# Patient Record
Sex: FEMALE | Race: OTHER | HISPANIC OR LATINO
[De-identification: names, ages, dates, MRNs, and addresses within clinical notes are randomized per-mention and may not be internally consistent; named-entity substitution may affect disease eponyms.]

---

## 2018-01-08 PROBLEM — Z00.00 ENCOUNTER FOR PREVENTIVE HEALTH EXAMINATION: Status: ACTIVE | Noted: 2018-01-08

## 2018-01-22 ENCOUNTER — APPOINTMENT (OUTPATIENT)
Dept: SURGERY | Facility: CLINIC | Age: 38
End: 2018-01-22
Payer: COMMERCIAL

## 2018-01-22 VITALS
WEIGHT: 146 LBS | HEART RATE: 92 BPM | SYSTOLIC BLOOD PRESSURE: 139 MMHG | BODY MASS INDEX: 26.87 KG/M2 | TEMPERATURE: 98.8 F | HEIGHT: 62 IN | DIASTOLIC BLOOD PRESSURE: 93 MMHG | OXYGEN SATURATION: 100 %

## 2018-01-22 PROCEDURE — 99203 OFFICE O/P NEW LOW 30 MIN: CPT

## 2018-03-12 ENCOUNTER — OUTPATIENT (OUTPATIENT)
Dept: OUTPATIENT SERVICES | Facility: HOSPITAL | Age: 38
LOS: 1 days | End: 2018-03-12
Payer: COMMERCIAL

## 2018-03-12 VITALS
OXYGEN SATURATION: 99 % | RESPIRATION RATE: 16 BRPM | HEIGHT: 62 IN | SYSTOLIC BLOOD PRESSURE: 122 MMHG | WEIGHT: 141.98 LBS | TEMPERATURE: 98 F | DIASTOLIC BLOOD PRESSURE: 88 MMHG | HEART RATE: 78 BPM

## 2018-03-12 DIAGNOSIS — Z98.890 OTHER SPECIFIED POSTPROCEDURAL STATES: Chronic | ICD-10-CM

## 2018-03-12 DIAGNOSIS — Z01.818 ENCOUNTER FOR OTHER PREPROCEDURAL EXAMINATION: ICD-10-CM

## 2018-03-12 DIAGNOSIS — N63.0 UNSPECIFIED LUMP IN UNSPECIFIED BREAST: ICD-10-CM

## 2018-03-12 PROCEDURE — G0463: CPT

## 2018-03-12 RX ORDER — SODIUM CHLORIDE 9 MG/ML
3 INJECTION INTRAMUSCULAR; INTRAVENOUS; SUBCUTANEOUS EVERY 8 HOURS
Qty: 0 | Refills: 0 | Status: DISCONTINUED | OUTPATIENT
Start: 2018-03-30 | End: 2018-03-30

## 2018-03-12 NOTE — H&P PST ADULT - NEGATIVE ALLERGY TYPES
no reactions to insect bites/no reactions to animals/no reactions to medicines/no outdoor environmental allergies/no indoor environmental allergies/no reactions to food

## 2018-03-12 NOTE — H&P PST ADULT - NEGATIVE OPHTHALMOLOGIC SYMPTOMS
no diplopia/no blurred vision L/no blurred vision R/no lacrimation L/no lacrimation R/no photophobia

## 2018-03-12 NOTE — H&P PST ADULT - REASON FOR ADMISSION
Felt a lump in right breast for 2-3 years, but because she has implants her doctor said what she is feeling in the right lateral breast is just the implant. But in 10/17, pt went back to her doctor and requested a sonogram

## 2018-03-12 NOTE — H&P PST ADULT - HISTORY OF PRESENT ILLNESS
37 yo female reports the above. Pt states the US of both breasts revealed a lump in the right breast, but at 4 o'clok in her right breast. Pt denies pain, discharge, redness in the right breast

## 2018-03-12 NOTE — H&P PST ADULT - FAMILY HISTORY
Mother  Still living? Yes, Estimated age: Age Unknown  Family history of hyperlipidemia, Age at diagnosis: Age Unknown     Father  Still living? Yes, Estimated age: Age Unknown  Family history of hypertension in father, Age at diagnosis: Age Unknown     Sibling  Still living? Yes, Estimated age: Age Unknown  Family history of high cholesterol, Age at diagnosis: Age Unknown

## 2018-03-12 NOTE — H&P PST ADULT - NSANTHOSAYNRD_GEN_A_CORE
No. MAURY screening performed.  STOP BANG Legend: 0-2 = LOW Risk; 3-4 = INTERMEDIATE Risk; 5-8 = HIGH Risk

## 2018-03-12 NOTE — H&P PST ADULT - NEGATIVE ENMT SYMPTOMS
no nasal congestion/no vertigo/no ear pain/no nasal discharge/no dry mouth/no tinnitus/no sinus symptoms/no hearing difficulty

## 2018-03-29 ENCOUNTER — TRANSCRIPTION ENCOUNTER (OUTPATIENT)
Age: 38
End: 2018-03-29

## 2018-03-30 ENCOUNTER — OUTPATIENT (OUTPATIENT)
Dept: OUTPATIENT SERVICES | Facility: HOSPITAL | Age: 38
LOS: 1 days | End: 2018-03-30
Payer: COMMERCIAL

## 2018-03-30 ENCOUNTER — RESULT REVIEW (OUTPATIENT)
Age: 38
End: 2018-03-30

## 2018-03-30 VITALS
TEMPERATURE: 99 F | SYSTOLIC BLOOD PRESSURE: 126 MMHG | OXYGEN SATURATION: 100 % | HEART RATE: 78 BPM | HEIGHT: 62 IN | DIASTOLIC BLOOD PRESSURE: 82 MMHG | RESPIRATION RATE: 16 BRPM | WEIGHT: 141.98 LBS

## 2018-03-30 VITALS
DIASTOLIC BLOOD PRESSURE: 66 MMHG | RESPIRATION RATE: 16 BRPM | TEMPERATURE: 98 F | SYSTOLIC BLOOD PRESSURE: 117 MMHG | HEART RATE: 76 BPM | OXYGEN SATURATION: 100 %

## 2018-03-30 DIAGNOSIS — N63.0 UNSPECIFIED LUMP IN UNSPECIFIED BREAST: ICD-10-CM

## 2018-03-30 DIAGNOSIS — Z98.890 OTHER SPECIFIED POSTPROCEDURAL STATES: Chronic | ICD-10-CM

## 2018-03-30 DIAGNOSIS — Z01.818 ENCOUNTER FOR OTHER PREPROCEDURAL EXAMINATION: ICD-10-CM

## 2018-03-30 LAB — HCG UR QL: NEGATIVE — SIGNIFICANT CHANGE UP

## 2018-03-30 PROCEDURE — 19125 EXCISION BREAST LESION: CPT | Mod: RT

## 2018-03-30 PROCEDURE — 19281 PERQ DEVICE BREAST 1ST IMAG: CPT | Mod: RT

## 2018-03-30 PROCEDURE — 19281 PERQ DEVICE BREAST 1ST IMAG: CPT

## 2018-03-30 PROCEDURE — 88305 TISSUE EXAM BY PATHOLOGIST: CPT

## 2018-03-30 PROCEDURE — 76098 X-RAY EXAM SURGICAL SPECIMEN: CPT

## 2018-03-30 PROCEDURE — 81025 URINE PREGNANCY TEST: CPT

## 2018-03-30 PROCEDURE — 88305 TISSUE EXAM BY PATHOLOGIST: CPT | Mod: 26

## 2018-03-30 PROCEDURE — 76098 X-RAY EXAM SURGICAL SPECIMEN: CPT | Mod: 26

## 2018-03-30 RX ORDER — OXYCODONE AND ACETAMINOPHEN 5; 325 MG/1; MG/1
1 TABLET ORAL EVERY 6 HOURS
Qty: 0 | Refills: 0 | Status: DISCONTINUED | OUTPATIENT
Start: 2018-03-30 | End: 2018-03-30

## 2018-03-30 RX ORDER — HYDROMORPHONE HYDROCHLORIDE 2 MG/ML
0.5 INJECTION INTRAMUSCULAR; INTRAVENOUS; SUBCUTANEOUS
Qty: 0 | Refills: 0 | Status: DISCONTINUED | OUTPATIENT
Start: 2018-03-30 | End: 2018-03-30

## 2018-03-30 RX ORDER — ONDANSETRON 8 MG/1
4 TABLET, FILM COATED ORAL ONCE
Qty: 0 | Refills: 0 | Status: DISCONTINUED | OUTPATIENT
Start: 2018-03-30 | End: 2018-03-30

## 2018-03-30 RX ORDER — SODIUM CHLORIDE 9 MG/ML
1000 INJECTION, SOLUTION INTRAVENOUS
Qty: 0 | Refills: 0 | Status: DISCONTINUED | OUTPATIENT
Start: 2018-03-30 | End: 2018-03-30

## 2018-03-30 RX ORDER — ONDANSETRON 8 MG/1
4 TABLET, FILM COATED ORAL EVERY 6 HOURS
Qty: 0 | Refills: 0 | Status: DISCONTINUED | OUTPATIENT
Start: 2018-03-30 | End: 2018-04-07

## 2018-03-30 NOTE — ASU DISCHARGE PLAN (ADULT/PEDIATRIC). - MEDICATION SUMMARY - MEDICATIONS TO TAKE
I will START or STAY ON the medications listed below when I get home from the hospital:    oxyCODONE-acetaminophen 5 mg-325 mg oral tablet  -- 1 tab(s) by mouth every 6 hours, As Needed -for moderate pain MDD:4 tablets   -- Caution federal law prohibits the transfer of this drug to any person other  than the person for whom it was prescribed.  May cause drowsiness.  Alcohol may intensify this effect.  Use care when operating dangerous machinery.  This prescription cannot be refilled.  This product contains acetaminophen.  Do not use  with any other product containing acetaminophen to prevent possible liver damage.  Using more of this medication than prescribed may cause serious breathing problems.    -- Indication: For Mass of breast

## 2018-03-30 NOTE — BRIEF OPERATIVE NOTE - PROCEDURE
<<-----Click on this checkbox to enter Procedure Mammogram breast right for localization, 3 views  03/30/2018    Active  [facility]  Breast mass excision  03/30/2018    Active  [facility]

## 2018-04-04 LAB — SURGICAL PATHOLOGY FINAL REPORT - CH: SIGNIFICANT CHANGE UP

## 2018-04-10 PROBLEM — Z80.6 FAMILY HISTORY OF LEUKEMIA: Status: ACTIVE | Noted: 2018-01-22

## 2018-04-10 PROBLEM — Z83.49 FAMILY HISTORY OF HYPERLIPIDEMIA: Status: ACTIVE | Noted: 2018-01-22

## 2018-04-10 PROBLEM — Z78.9 SOCIAL ALCOHOL USE: Status: ACTIVE | Noted: 2018-01-22

## 2018-04-10 PROBLEM — Z80.3 FAMILY HISTORY OF MALIGNANT NEOPLASM OF BREAST: Status: ACTIVE | Noted: 2018-01-22

## 2018-04-10 PROBLEM — Z82.49 FAMILY HISTORY OF HYPERTENSION: Status: ACTIVE | Noted: 2018-01-22

## 2018-04-10 PROBLEM — Z87.898 HISTORY OF LUMP OF RIGHT BREAST: Status: RESOLVED | Noted: 2018-04-10 | Resolved: 2018-04-10

## 2018-04-10 PROBLEM — Z87.898 HISTORY OF LUMP OF LEFT BREAST: Status: RESOLVED | Noted: 2018-04-10 | Resolved: 2018-04-10

## 2018-04-16 ENCOUNTER — APPOINTMENT (OUTPATIENT)
Dept: SURGERY | Facility: CLINIC | Age: 38
End: 2018-04-16
Payer: COMMERCIAL

## 2018-04-16 DIAGNOSIS — Z78.9 OTHER SPECIFIED HEALTH STATUS: ICD-10-CM

## 2018-04-16 DIAGNOSIS — Z80.3 FAMILY HISTORY OF MALIGNANT NEOPLASM OF BREAST: ICD-10-CM

## 2018-04-16 DIAGNOSIS — Z83.49 FAMILY HISTORY OF OTHER ENDOCRINE, NUTRITIONAL AND METABOLIC DISEASES: ICD-10-CM

## 2018-04-16 DIAGNOSIS — Z87.898 PERSONAL HISTORY OF OTHER SPECIFIED CONDITIONS: ICD-10-CM

## 2018-04-16 DIAGNOSIS — Z82.49 FAMILY HISTORY OF ISCHEMIC HEART DISEASE AND OTHER DISEASES OF THE CIRCULATORY SYSTEM: ICD-10-CM

## 2018-04-16 DIAGNOSIS — Z80.6 FAMILY HISTORY OF LEUKEMIA: ICD-10-CM

## 2018-04-16 PROCEDURE — 99213 OFFICE O/P EST LOW 20 MIN: CPT

## 2018-11-19 PROBLEM — Z86.018 HISTORY OF INTRADUCTAL PAPILLOMA: Status: RESOLVED | Noted: 2018-11-19 | Resolved: 2018-11-19

## 2018-11-26 ENCOUNTER — APPOINTMENT (OUTPATIENT)
Dept: SURGERY | Facility: CLINIC | Age: 38
End: 2018-11-26
Payer: COMMERCIAL

## 2018-11-26 DIAGNOSIS — Z86.018 PERSONAL HISTORY OF OTHER BENIGN NEOPLASM: ICD-10-CM

## 2018-12-10 ENCOUNTER — APPOINTMENT (OUTPATIENT)
Dept: SURGERY | Facility: CLINIC | Age: 38
End: 2018-12-10
Payer: COMMERCIAL

## 2018-12-10 VITALS
BODY MASS INDEX: 26.68 KG/M2 | SYSTOLIC BLOOD PRESSURE: 123 MMHG | HEIGHT: 62 IN | HEART RATE: 84 BPM | DIASTOLIC BLOOD PRESSURE: 84 MMHG | OXYGEN SATURATION: 97 % | TEMPERATURE: 99 F | WEIGHT: 145 LBS

## 2018-12-10 PROCEDURE — 99213 OFFICE O/P EST LOW 20 MIN: CPT

## 2019-03-14 PROBLEM — N63.0 UNSPECIFIED LUMP IN UNSPECIFIED BREAST: Chronic | Status: ACTIVE | Noted: 2018-03-12

## 2019-03-25 ENCOUNTER — APPOINTMENT (OUTPATIENT)
Dept: SURGERY | Facility: CLINIC | Age: 39
End: 2019-03-25
Payer: COMMERCIAL

## 2019-03-25 VITALS
HEART RATE: 82 BPM | WEIGHT: 150 LBS | HEIGHT: 62 IN | DIASTOLIC BLOOD PRESSURE: 104 MMHG | SYSTOLIC BLOOD PRESSURE: 145 MMHG | TEMPERATURE: 98.7 F | BODY MASS INDEX: 27.6 KG/M2

## 2019-03-25 PROCEDURE — 99213 OFFICE O/P EST LOW 20 MIN: CPT

## 2020-02-10 ENCOUNTER — APPOINTMENT (OUTPATIENT)
Dept: SURGERY | Facility: CLINIC | Age: 40
End: 2020-02-10
Payer: COMMERCIAL

## 2020-02-10 VITALS
DIASTOLIC BLOOD PRESSURE: 86 MMHG | HEIGHT: 62 IN | SYSTOLIC BLOOD PRESSURE: 140 MMHG | HEART RATE: 72 BPM | TEMPERATURE: 98.1 F | BODY MASS INDEX: 28.71 KG/M2 | OXYGEN SATURATION: 100 % | WEIGHT: 156 LBS

## 2020-02-10 PROCEDURE — 99213 OFFICE O/P EST LOW 20 MIN: CPT

## 2020-02-10 NOTE — DATA REVIEWED
[FreeTextEntry1] : \par Patient Name:  JENNY SOLER	Patient ID:  [id number]\par Patient :   1980	Gender:   Female\par Accession Number:   [id number]	Study Date:   17 Sep 2019 17:54\par Referring Phys.:  DOE FLORENTINO	Performing Location:   [facility]\par EXAM:  DIAGNOSTIC LEFT\par \par \par OVERALL IMPRESSION:\par \par Stable calcifications in the retroareolar region of the left breast are probably benign. They were initially seen in 2019. Short-term interval follow-up with mammography is recommended in 6 months (2020).\par \par A 6 month follow-up of both breast(s) is recommended. A bilateral full-field diagnostic mammogram with additional magnification views of the left breast is recommended in 6 months (2020). The patient will be due for bilateral annual surveillance at that time. Given breast density, adjunct screening with bilateral complete breast ultrasound may also be obtained at that time, at the discretion of the referring physician. A letter will be sent to the patient to return for follow up.\par \par The findings and recommendations were discussed with the patient.\par \par BI-RADS 3: PROBABLY BENIGN\par \par MAMMO TOMOSYNTHESIS DIAGNOSTIC LEFT, US BREAST LIMITED RIGHT\par \par Clinical Indication: Patient returns for a short term follow up of findings in bilateral breasts.\par \par Compared to: 2019, 10/25/2017, and 2017

## 2020-02-10 NOTE — PHYSICAL EXAM
[Alert] : alert [Oriented to Place] : oriented to place [Oriented to Time] : oriented to time [Oriented to Person] : oriented to person [de-identified] : The patient is alert, well-groomed, and cheerful. [Calm] : calm [de-identified] :  Neck supple. Trachea midline. Thyroid isthmus barely palpable, lobes not felt. [de-identified] : Pendulous ( BL implants) , symmetric. No masses; nipples without discharge. No palpable supraclavicular masses. Axillas are benign. [de-identified] : Thorax symmetric with good excursion. Lungs resonant. Breath sounds vesicular with no added sounds.

## 2020-02-10 NOTE — HISTORY OF PRESENT ILLNESS
[de-identified] : This is  a 40 year   old patient presenting today for a breast exam and to discuss the results of her recent  breast imaging.  Patient was seen and examined last  in March 2019 and was advised to repeat  Right breast Mammo and Left breast US in September.  Patient had a Right breast US and   Left  breast Mammogram on 09/17/2019. Deemed BIRADS category 3.   Ms. MARIN denies any trauma and she has no nipple discharge. Patient denies any fever, night sweats or loss of appetite.    There is  family history of breast carcinoma in Father's sister.    She has menstrual period  now.   Patient is on no hormonal replacement therapy.

## 2020-02-10 NOTE — PLAN
[FreeTextEntry1] : Ms. MARIN  is presenting  today for an evaluation .  she is doing well and offers no complaints.  Results of  her recent  imaging and physical examination findings were discussed in details.   She  was advised to have BL   breast US and Mammogram in  MArch 2020 and return after the tests. Importance of monthly self-breast examination was reinforced.  Patient's questions and concerns addressed to patient's satisfaction.\par

## 2021-01-18 NOTE — ASU DISCHARGE PLAN (ADULT/PEDIATRIC). - DRIVING
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 Dr. Delano Coley discussed pathology and recommended breast clinic. Encouraged Breast Clinic attendance and informed that [facility] will give apt information. All cards given for surgeons and oncologists. Wants to see      Patient Expresses Need(s) For: a surgeon, will consult with Daughter and let me know Monday    Requests Referral to Doctor(s) with appointment(s): n/a    Additional Referral(s): Brendan Rueda/Yudith Oswald: [facility],        Biopsy site status: sore, but okay    Teaching Sheets provided: Cancer Type: IDC, Tumor markers, radiation, Lump/Mast, Needle loc, [place] Node, Breast Cancer, Navigation Packet, Nurse Navigation contact information, Breast Clinic appt and map     Currently on HRT: no     If yes, was patient instructed to stop immediately: n/a    Notes: Explained terms doctor and navigators will discuss at next appointments. Questions addressed and encouraged patient to ask [facility]. Will receive call Monday. Referral faxed to Navigation.     Other :  Patient is in a W/C, she does not ambulate, she has MS      Results faxed to : Dr. Michelle Riley
No

## 2021-03-23 NOTE — H&P PST ADULT - VENOUS THROMBOEMBOLISM HISTORY
Called patient LVM for patient to call back to schedule f/u appointment with Dr Rice before her next botox injection  prior major surgery

## 2021-06-14 ENCOUNTER — APPOINTMENT (OUTPATIENT)
Dept: SURGERY | Facility: CLINIC | Age: 41
End: 2021-06-14
Payer: COMMERCIAL

## 2021-06-14 VITALS
WEIGHT: 150 LBS | SYSTOLIC BLOOD PRESSURE: 134 MMHG | BODY MASS INDEX: 27.6 KG/M2 | DIASTOLIC BLOOD PRESSURE: 91 MMHG | HEART RATE: 93 BPM | HEIGHT: 62 IN

## 2021-06-14 DIAGNOSIS — N63.0 UNSPECIFIED LUMP IN UNSPECIFIED BREAST: ICD-10-CM

## 2021-06-14 PROCEDURE — 99072 ADDL SUPL MATRL&STAF TM PHE: CPT

## 2021-06-14 PROCEDURE — 99213 OFFICE O/P EST LOW 20 MIN: CPT

## 2021-06-14 RX ORDER — AMLODIPINE BESYLATE 2.5 MG/1
2.5 TABLET ORAL
Refills: 0 | Status: ACTIVE | COMMUNITY

## 2021-06-14 NOTE — HISTORY OF PRESENT ILLNESS
[de-identified] : This is  a 40 year   old patient presenting today for a breast exam  .   she had her last breast imaging in 2019. Ms. MARIN denies any trauma and she has no nipple discharge. Patient denies any fever, night sweats or loss of appetite. There is family history of breast carcinoma in Father's sister. She has menstrual period in May 2020. Patient is on no hormonal replacement therapy. \par  [de-identified] : Patient reports no interval changes to her overall health status or medical history

## 2021-06-14 NOTE — PLAN
[FreeTextEntry1] : Ms. HERNANDEZ SOLER  is presenting  today for an evaluation .  she is doing well and offers no complaints.  Results of  her recent  imaging and physical examination findings were discussed in details.   She  was advised to have BL   breast US and Mammogram   and return after the tests in 6 months. Importance of monthly self-breast examination was reinforced.  Patient's questions and concerns addressed to patient's satisfaction.\par \par Vitamin E daily for breast pain \par Avoid caffein and Chocolates to prevent breast pain

## 2021-06-14 NOTE — PHYSICAL EXAM
[Alert] : alert [Oriented to Person] : oriented to person [Oriented to Place] : oriented to place [Oriented to Time] : oriented to time [Calm] : calm [de-identified] : She  is alert, well-groomed, and in NAD\par   [de-identified] : anicteric.  Nasal mucosa pink, septum midline. Oral mucosa pink.  Tongue midline, Pharynx without exudates.\par   [de-identified] : Neck supple. Trachea midline. Thyroid isthmus barely palpable, lobes not felt.\par   [de-identified] : Pendulous ( BL implants) , symmetric. No masses; nipples without discharge. No palpable supraclavicular masses. Axillas are benign.

## 2021-06-29 ENCOUNTER — NON-APPOINTMENT (OUTPATIENT)
Age: 41
End: 2021-06-29

## 2021-07-26 ENCOUNTER — APPOINTMENT (OUTPATIENT)
Dept: SURGERY | Facility: CLINIC | Age: 41
End: 2021-07-26
Payer: COMMERCIAL

## 2021-07-26 VITALS
SYSTOLIC BLOOD PRESSURE: 132 MMHG | WEIGHT: 150 LBS | HEIGHT: 62 IN | HEART RATE: 87 BPM | BODY MASS INDEX: 27.6 KG/M2 | DIASTOLIC BLOOD PRESSURE: 92 MMHG | TEMPERATURE: 98 F

## 2021-07-26 PROCEDURE — 99072 ADDL SUPL MATRL&STAF TM PHE: CPT

## 2021-07-26 PROCEDURE — 99213 OFFICE O/P EST LOW 20 MIN: CPT

## 2021-07-26 NOTE — PHYSICAL EXAM
[Alert] : alert [Oriented to Person] : oriented to person [Oriented to Place] : oriented to place [Oriented to Time] : oriented to time [Calm] : calm [de-identified] : She  is alert, well-groomed, and in NAD\par   [de-identified] : anicteric.  Nasal mucosa pink, septum midline. Oral mucosa pink.  Tongue midline, Pharynx without exudates.\par   [de-identified] : Neck supple. Trachea midline. Thyroid isthmus barely palpable, lobes not felt.\par   [de-identified] : Pendulous ( BL implants) , symmetric. No masses; nipples without discharge. No palpable supraclavicular masses. Axillas are benign.

## 2021-07-26 NOTE — DATA REVIEWED
[FreeTextEntry1] : Pt Name: Emily Wu\par  \par : 1980\par  \par MRN: [id number]\par  \par Referring: Ciaran Rondon\par  \par CC Recipient(s):  \par  \par Pt Phone: [phone]\par  \par  \par Procedure(s) \par Accession Number(s)\par Date of Service\par US BREAST CORE BIOPSY\par MAMMO DIGITAL POST PROCEDURE LEFT\par [id number]\par [id number]\par 21\par 21\par  \par Pathology Report Received From [facility]:\par  \par The pathology report of the left breast ultrasound-guided core biopsy dated 2021 indicated that the target at left 2:00, 3 cm from the nipple, is HIGH RISK \par  \par Breast, left, 2 o'clock, 3cm FN, core biopsy: \par - *Intraductal papilloma*.\par - Proliferative fibrocystic changes with usual ductal hyperplasia. \par  \par The pathology results are concordant with the imaging findings.\par  \par The patient has been notified of these results by telephone. She has been advised to contact your office and to arrange for SURGICAL CONSULTATION to discuss management options.\par  \par The 2 hypoechoic masses at left 4:00, 5 cm from the nipple, noted on prior ultrasound dated 2021, remain probably benign, favored to reflect a cyst cluster and cyst respectively. Recommend 6 month follow-up with targeted left ultrasound.\par  \par Surgical consultation of the left breast(s) is recommended. _\par  \par Electronic Signature: I personally reviewed the images and agree with this report. Final Report: Dictated by  and Signed by Attending Margie Ureña MD 2021 4:32 PM\par  \par Addended by: Margie Ureña DO on 2021  4:32 PM\par

## 2021-07-26 NOTE — PLAN
[FreeTextEntry1] : Ms. HERNANDEZ SOLER  was told significance of findings, options, risks and benefits were explained.  Informed consent for excision left breast mass with spot localization and potential risks, benefits and alternatives (surgical options were discussed including non-surgical options or the option of no surgery) to the planned surgery were discussed in depth.  All surgical options were discussed including non-surgical treatments.  She wishes to proceed with surgery.  We will plan for surgery on at the next available date, pending any required insurance pre-certification or pre-approval. She agrees to obtain any necessary pre-operative evaluations and testing prior to surgery.\par Patient advised to seek immediate medical attention with any acute change in symptoms or with the development of any new or worsening symptoms.  Patient's questions and concerns addressed to patient's satisfaction, and patient verbalized an understanding of the information discussed.\par \par

## 2021-07-26 NOTE — HISTORY OF PRESENT ILLNESS
[de-identified] : Ms. HERNANDEZ SOLER  is s/p BL breast US and Mammogram on 06/23/2021 that was deemed BIRADS4.  Ms. HERNANDEZ SOLER  is s/p US guided left breast biopsy on 07/13/2021. Patient's pathology results were  consistent with Intraductal papilloma,  Proliferative fibrocystic changes with usual ductal hyperplasia.  The pathology results are concordant with the imaging findings.  Ms. HERNANDEZ SOLER denies any trauma and she has no nipple discharge. Patient denies any fever, night sweats or loss of appetite. [de-identified] : Patient reports no interval changes to her overall health status or medical history

## 2021-08-27 ENCOUNTER — OUTPATIENT (OUTPATIENT)
Dept: OUTPATIENT SERVICES | Facility: HOSPITAL | Age: 41
LOS: 1 days | End: 2021-08-27
Payer: COMMERCIAL

## 2021-08-27 VITALS
WEIGHT: 149.91 LBS | DIASTOLIC BLOOD PRESSURE: 90 MMHG | OXYGEN SATURATION: 98 % | HEIGHT: 62 IN | TEMPERATURE: 98 F | RESPIRATION RATE: 18 BRPM | HEART RATE: 84 BPM | SYSTOLIC BLOOD PRESSURE: 144 MMHG

## 2021-08-27 DIAGNOSIS — N63.20 UNSPECIFIED LUMP IN THE LEFT BREAST, UNSPECIFIED QUADRANT: ICD-10-CM

## 2021-08-27 DIAGNOSIS — Z01.818 ENCOUNTER FOR OTHER PREPROCEDURAL EXAMINATION: ICD-10-CM

## 2021-08-27 DIAGNOSIS — Z98.890 OTHER SPECIFIED POSTPROCEDURAL STATES: Chronic | ICD-10-CM

## 2021-08-27 DIAGNOSIS — I10 ESSENTIAL (PRIMARY) HYPERTENSION: ICD-10-CM

## 2021-08-27 PROCEDURE — G0463: CPT

## 2021-08-27 RX ORDER — SODIUM CHLORIDE 9 MG/ML
3 INJECTION INTRAMUSCULAR; INTRAVENOUS; SUBCUTANEOUS EVERY 8 HOURS
Refills: 0 | Status: DISCONTINUED | OUTPATIENT
Start: 2021-09-03 | End: 2021-09-10

## 2021-08-27 NOTE — H&P PST ADULT - PROBLEM SELECTOR PLAN 1
Patient is scheduled for excision of left breast mass with pre-operative needle localization on 9/3/2021. Written and oral preoperative instructions given to patient with understanding verbalized. Instructions given to include using 4% chlorhexidine wash as directed day of surgery, maintaining NPO status post midnight day before surgery and expecting a phone call day before surgery to give arrival time for surgery.

## 2021-08-27 NOTE — H&P PST ADULT - NSICDXFAMILYHX_GEN_ALL_CORE_FT
FAMILY HISTORY:  Father  Still living? Yes, Estimated age: Age Unknown  Family history of hypertension in father, Age at diagnosis: Age Unknown    Mother  Still living? Yes, Estimated age: Age Unknown  Family history of hyperlipidemia, Age at diagnosis: Age Unknown    Sibling  Still living? Yes, Estimated age: Age Unknown  Family history of high cholesterol, Age at diagnosis: Age Unknown

## 2021-08-27 NOTE — H&P PST ADULT - FEMALE-SPECIFIC SYMPTOMS
Patient has Nexplanon birth control implant in upper left arm/irregular menses/abnormal vaginal bleeding

## 2021-08-27 NOTE — H&P PST ADULT - PROBLEM SELECTOR PLAN 2
Patient instructed with understanding verbalized to take amlodipine with a sip of water the morning of surgery.

## 2021-08-27 NOTE — H&P PST ADULT - NSICDXPASTSURGICALHX_GEN_ALL_CORE_FT
PAST SURGICAL HISTORY:   delivery delivered     H/O bilateral breast implants     H/O breast biopsy

## 2021-08-28 DIAGNOSIS — Z01.818 ENCOUNTER FOR OTHER PREPROCEDURAL EXAMINATION: ICD-10-CM

## 2021-08-31 ENCOUNTER — APPOINTMENT (OUTPATIENT)
Dept: DISASTER EMERGENCY | Facility: CLINIC | Age: 41
End: 2021-08-31

## 2021-09-01 LAB — SARS-COV-2 N GENE NPH QL NAA+PROBE: NOT DETECTED

## 2021-09-02 ENCOUNTER — TRANSCRIPTION ENCOUNTER (OUTPATIENT)
Age: 41
End: 2021-09-02

## 2021-09-03 ENCOUNTER — APPOINTMENT (OUTPATIENT)
Dept: SURGERY | Facility: HOSPITAL | Age: 41
End: 2021-09-03
Payer: COMMERCIAL

## 2021-09-03 ENCOUNTER — RESULT REVIEW (OUTPATIENT)
Age: 41
End: 2021-09-03

## 2021-09-03 ENCOUNTER — OUTPATIENT (OUTPATIENT)
Dept: OUTPATIENT SERVICES | Facility: HOSPITAL | Age: 41
LOS: 1 days | End: 2021-09-03
Payer: COMMERCIAL

## 2021-09-03 VITALS
OXYGEN SATURATION: 100 % | SYSTOLIC BLOOD PRESSURE: 116 MMHG | HEART RATE: 87 BPM | DIASTOLIC BLOOD PRESSURE: 86 MMHG | RESPIRATION RATE: 19 BRPM

## 2021-09-03 VITALS
HEART RATE: 73 BPM | TEMPERATURE: 98 F | DIASTOLIC BLOOD PRESSURE: 83 MMHG | WEIGHT: 149.91 LBS | SYSTOLIC BLOOD PRESSURE: 134 MMHG | HEIGHT: 62 IN | RESPIRATION RATE: 16 BRPM | OXYGEN SATURATION: 100 %

## 2021-09-03 DIAGNOSIS — Z98.890 OTHER SPECIFIED POSTPROCEDURAL STATES: Chronic | ICD-10-CM

## 2021-09-03 DIAGNOSIS — Z01.818 ENCOUNTER FOR OTHER PREPROCEDURAL EXAMINATION: ICD-10-CM

## 2021-09-03 DIAGNOSIS — N63.20 UNSPECIFIED LUMP IN THE LEFT BREAST, UNSPECIFIED QUADRANT: ICD-10-CM

## 2021-09-03 LAB — HCG UR QL: NEGATIVE — SIGNIFICANT CHANGE UP

## 2021-09-03 PROCEDURE — 88305 TISSUE EXAM BY PATHOLOGIST: CPT

## 2021-09-03 PROCEDURE — 19125 EXCISION BREAST LESION: CPT | Mod: LT

## 2021-09-03 PROCEDURE — 76098 X-RAY EXAM SURGICAL SPECIMEN: CPT

## 2021-09-03 PROCEDURE — 19125 EXCISION BREAST LESION: CPT

## 2021-09-03 PROCEDURE — 19281 PERQ DEVICE BREAST 1ST IMAG: CPT

## 2021-09-03 PROCEDURE — 19281 PERQ DEVICE BREAST 1ST IMAG: CPT | Mod: LT

## 2021-09-03 PROCEDURE — 88305 TISSUE EXAM BY PATHOLOGIST: CPT | Mod: 26

## 2021-09-03 PROCEDURE — 76098 X-RAY EXAM SURGICAL SPECIMEN: CPT | Mod: 26

## 2021-09-03 PROCEDURE — 81025 URINE PREGNANCY TEST: CPT

## 2021-09-03 RX ORDER — OXYCODONE AND ACETAMINOPHEN 5; 325 MG/1; MG/1
1 TABLET ORAL ONCE
Refills: 0 | Status: DISCONTINUED | OUTPATIENT
Start: 2021-09-03 | End: 2021-09-10

## 2021-09-03 RX ORDER — ONDANSETRON 8 MG/1
4 TABLET, FILM COATED ORAL ONCE
Refills: 0 | Status: DISCONTINUED | OUTPATIENT
Start: 2021-09-03 | End: 2021-09-10

## 2021-09-03 RX ORDER — ACETAMINOPHEN 500 MG
2 TABLET ORAL
Qty: 24 | Refills: 0
Start: 2021-09-03 | End: 2021-09-05

## 2021-09-03 RX ORDER — AMLODIPINE BESYLATE 2.5 MG/1
1 TABLET ORAL
Qty: 0 | Refills: 0 | DISCHARGE

## 2021-09-03 RX ADMIN — SODIUM CHLORIDE 3 MILLILITER(S): 9 INJECTION INTRAMUSCULAR; INTRAVENOUS; SUBCUTANEOUS at 11:15

## 2021-09-03 NOTE — ASU DISCHARGE PLAN (ADULT/PEDIATRIC) - ASU DC SPECIAL INSTRUCTIONSFT
Please follow-up with your surgeon in 1 week. Drink plenty of fluids and rest as needed. Do not lift anything heavier than 10 pounds for 2 weeks. You may take tylenol or ibuprofen for pain. Call for any fever over 101, nausea, vomiting, severe pain, no passing of gas or bowel movement. You may shower 48 hours postoperatively. Remove outer dressing. Keep white steri-strips in place and allow them to fall off on their own. Pat dry.

## 2021-09-03 NOTE — ASU DISCHARGE PLAN (ADULT/PEDIATRIC) - CARE PROVIDER_API CALL
Ciaran Rondon)  Surgery  [address] [facility], [place] [address]  Phone: [phone]  Fax: [phone]  Follow Up Time:

## 2021-09-10 LAB — SURGICAL PATHOLOGY STUDY: SIGNIFICANT CHANGE UP

## 2021-09-21 PROBLEM — I10 ESSENTIAL (PRIMARY) HYPERTENSION: Chronic | Status: ACTIVE | Noted: 2021-08-27

## 2021-09-27 ENCOUNTER — APPOINTMENT (OUTPATIENT)
Dept: SURGERY | Facility: CLINIC | Age: 41
End: 2021-09-27
Payer: COMMERCIAL

## 2021-09-27 PROCEDURE — 99024 POSTOP FOLLOW-UP VISIT: CPT

## 2022-01-31 ENCOUNTER — APPOINTMENT (OUTPATIENT)
Dept: SURGERY | Facility: CLINIC | Age: 42
End: 2022-01-31
Payer: COMMERCIAL

## 2022-02-28 ENCOUNTER — APPOINTMENT (OUTPATIENT)
Dept: SURGERY | Facility: CLINIC | Age: 42
End: 2022-02-28
Payer: COMMERCIAL

## 2022-02-28 VITALS
HEIGHT: 62 IN | WEIGHT: 150 LBS | DIASTOLIC BLOOD PRESSURE: 91 MMHG | HEART RATE: 74 BPM | BODY MASS INDEX: 27.6 KG/M2 | SYSTOLIC BLOOD PRESSURE: 136 MMHG

## 2022-02-28 DIAGNOSIS — N63.20 UNSPECIFIED LUMP IN THE LEFT BREAST, UNSPECIFIED QUADRANT: ICD-10-CM

## 2022-02-28 PROCEDURE — 99213 OFFICE O/P EST LOW 20 MIN: CPT

## 2022-02-28 NOTE — DATA REVIEWED
[FreeTextEntry1] : TANIA JENNY LAURA      2\par \par \par \par Surgical Final Report\par \par \par \par \par Final Diagnosis\par Left breast; needle localized excision:\par - Fibrocystic changes with florid ductal epithelial hyperplasia,\par nodular adenosis and apocrine metaplasia\par - Previous biopsy site is identified\par \par Verified by: Whitney Fleming M.D.\par (Electronic Signature)\par Reported on: 09/10/21 12:18 EDT, [facility],\par [address] [place] [address]\par Phone: [phone]   Fax: [phone]\par _________________________________________________________________\par \par Clinical History\par Excision of left breast with needle localization\par

## 2022-02-28 NOTE — HISTORY OF PRESENT ILLNESS
[de-identified] : This is  a 42 year   old patient presenting today for a breast exam .   Ms. MARIN  is s/p Excision of left breast mass with needle localization on 09/03/2021. Patient's pathology results were  consistent with Fibrocystic changes with florid ductal epithelial hyperplasia, nodular adenosis and apocrine metaplasia. Today  Ms. MARIN offers no complaints. patient reports no fever or  chills. patient reports occasional discomfort in the surgical area.    Ms. MARIN denies any trauma and she has no nipple discharge. Patient denies any fever, night sweats or loss of appetite.

## 2022-02-28 NOTE — PLAN
[FreeTextEntry1] : Ms. MARIN  is presenting  today for an evaluation .  she is doing well and offers no complaints.  Results of  her recent  imaging and physical examination findings were discussed in details.   She  was advised to have BL    breast US and Mammogram in  June 2022 and return after the tests. Importance of monthly self-breast examination was reinforced.  Patient's questions and concerns addressed to patient's satisfaction.\par \par

## 2022-02-28 NOTE — PHYSICAL EXAM
[Alert] : alert [Oriented to Person] : oriented to person [Oriented to Place] : oriented to place [Oriented to Time] : oriented to time [Calm] : calm [de-identified] : She  is alert, well-groomed, and in NAD\par   [de-identified] : anicteric.  Nasal mucosa pink, septum midline. Oral mucosa pink.  Tongue midline, Pharynx without exudates.\par   [de-identified] : Neck supple. Trachea midline. Thyroid isthmus barely palpable, lobes not felt.\par   [de-identified] :   ( BL implants) , symmetric. No masses; nipples without discharge. No palpable supraclavicular masses. Axillas are benign.

## 2022-10-24 NOTE — H&P PST ADULT - ATTENDING PHYSICIAN: I HAVE REVIEWED THE CLINICAL DOCUMENTATION AND AGREE WITH THE ABOVE NOTE
Closure 4 Information: This tab is for additional flaps and grafts above and beyond our usual structured repairs.  Please note if you enter information here it will not currently bill and you will need to add the billing information manually. Statement Selected

## 2023-01-30 ENCOUNTER — APPOINTMENT (OUTPATIENT)
Dept: SURGERY | Facility: CLINIC | Age: 43
End: 2023-01-30

## 2023-01-30 DIAGNOSIS — R92.8 OTHER ABNORMAL AND INCONCLUSIVE FINDINGS ON DIAGNOSTIC IMAGING OF BREAST: ICD-10-CM

## 2023-01-30 DIAGNOSIS — Z12.39 ENCOUNTER FOR OTHER SCREENING FOR MALIGNANT NEOPLASM OF BREAST: ICD-10-CM

## 2023-02-02 ENCOUNTER — APPOINTMENT (OUTPATIENT)
Dept: SURGERY | Facility: CLINIC | Age: 43
End: 2023-02-02

## 2023-02-20 NOTE — H&P PST ADULT - HISTORY OF PRESENT ILLNESS
Statement Selected 41year old female with pmhx of right breast benign lump and hypertension presents with today with c/o abnormal mammogram revealing benign left breat lump that was confirmed by biopsy. Patient is here today for presurgical testing for scheduled excision of left breast mass with preoperative needle localization on 9/3/2021

## 2023-08-16 PROBLEM — R92.8 ABNORMAL ULTRASOUND OF BREAST: Status: ACTIVE | Noted: 2023-08-16

## 2023-08-17 NOTE — DATA REVIEWED
[FreeTextEntry1] : Pt Name: Emily Wu    : 1980   MRN: [id number]   Referring: Ciaran CASAREZ Recipient(s):     Pt Phone: [phone]     Procedure(s)  Accession Number(s) Date of Service MAMMO SCREENING WITH IMPLANTS BILATERAL US BREAST [id number]            OVERALL IMPRESSION:    Probably benign sonographic findings bilaterally. Follow-up bilateral targeted breast ultrasound is again recommended in 6 months time to confirm stability.   There is no mammographic or sonographic evidence of malignancy.   A 6 month follow-up ultrasound of both breast(s) is recommended.    A letter will be sent to the patient to return for follow up.   The findings and recommendations were discussed with the patient.   BI-RADS 3: PROBABLY BENIGN     FINDINGS:   MAMMO TOMOSYNTHESIS SCREENING WITH IMPLANTS BILATERAL, US BREAST COMPLETE BILATERAL   Clinical Breast Exam: Patient does report clinical breast exam in the last year.   Clinical Indication: Routine screening. Family history of paternal aunt with breast cancer.   Compared to: 2022, 2021, 2021, 2020, 2019, 2019   MAMMOGRAM:    Tomosynthesis 3D and 2D imaging of the breast(s) were performed.  Current study was also evaluated with a computer aided detection (CAD) system.   Breast Density: Heterogeneously dense, which may obscure small masses.   No suspicious masses, calcifications, or other findings are seen. Bilateral prepectoral silicone implants. Postsurgical changes left breast. Normal left axillary tail lymph node, stable.   US BREAST COMPLETE BILATERAL   Ultrasound evaluation was performed including examination of all four quadrants of the breast(s) and the retroareolar regions.   Color flow, Gray scale and real-time ultrasound of both breasts was performed.    Postsurgical changes bilaterally.   In the right breast, there is a stable intramammary lymph node 9 axis, N5. There is a 4 mm  probable mildly complicated cyst right breast 10 axis, N4.   In the left breast, again noted are small scattered subcentimeter cysts/complicated cysts . There is a 6 mm oval parallel  isoechoic structure in the 2 axis, N3, possibly intramammary lymph node.    These are probably benign findings bilaterally.    The patient is status post surgical excision of a previously noted mass in the left breast 2 axis, N3.    Electronic Signature: I personally reviewed the images and agree with this report. Final Report: Dictated by  and Signed by Attending SHAHAB ENRIQUEZ MD 2023 10:53 AM

## 2023-08-17 NOTE — HISTORY OF PRESENT ILLNESS
[de-identified] : This is  a 43 year   old patient presenting today for a breast exam and to discuss the results of her last breast imaging. Patient had a BL breast US and   BL breast Mammogram on 08/09/2023 .  Deemed BIRADS category 3.   PERTINENT HISTORY:  Ms. MARIN is s/p Excision of left breast mass with needle localization on 09/03/2021. Patient's pathology results were consistent with Fibrocystic changes with florid ductal epithelial hyperplasia, nodular adenosis and apocrine metaplasia

## 2023-08-24 ENCOUNTER — APPOINTMENT (OUTPATIENT)
Dept: SURGERY | Facility: CLINIC | Age: 43
End: 2023-08-24

## 2024-08-19 DIAGNOSIS — Z12.39 ENCOUNTER FOR OTHER SCREENING FOR MALIGNANT NEOPLASM OF BREAST: ICD-10-CM

## 2024-09-16 DIAGNOSIS — R92.8 OTHER ABNORMAL AND INCONCLUSIVE FINDINGS ON DIAGNOSTIC IMAGING OF BREAST: ICD-10-CM

## 2025-03-12 PROBLEM — Z12.39 SCREENING BREAST EXAMINATION: Status: ACTIVE | Noted: 2025-03-12

## 2025-03-12 PROBLEM — Z12.39 SCREENING BREAST EXAMINATION: Status: RESOLVED | Noted: 2020-02-10 | Resolved: 2025-03-12

## 2025-03-17 ENCOUNTER — NON-APPOINTMENT (OUTPATIENT)
Age: 45
End: 2025-03-17

## 2025-03-17 ENCOUNTER — APPOINTMENT (OUTPATIENT)
Dept: SURGERY | Facility: CLINIC | Age: 45
End: 2025-03-17
Payer: COMMERCIAL

## 2025-03-17 VITALS
HEART RATE: 91 BPM | DIASTOLIC BLOOD PRESSURE: 81 MMHG | SYSTOLIC BLOOD PRESSURE: 122 MMHG | OXYGEN SATURATION: 100 % | BODY MASS INDEX: 24.66 KG/M2 | WEIGHT: 134 LBS | HEIGHT: 62 IN

## 2025-03-17 DIAGNOSIS — Z12.39 ENCOUNTER FOR OTHER SCREENING FOR MALIGNANT NEOPLASM OF BREAST: ICD-10-CM

## 2025-03-17 DIAGNOSIS — N64.4 MASTODYNIA: ICD-10-CM

## 2025-03-17 PROCEDURE — 99203 OFFICE O/P NEW LOW 30 MIN: CPT
